# Patient Record
Sex: MALE | URBAN - METROPOLITAN AREA
[De-identification: names, ages, dates, MRNs, and addresses within clinical notes are randomized per-mention and may not be internally consistent; named-entity substitution may affect disease eponyms.]

---

## 2021-12-07 ENCOUNTER — APPOINTMENT (RX ONLY)
Dept: URBAN - METROPOLITAN AREA CLINIC 12 | Facility: CLINIC | Age: 71
Setting detail: DERMATOLOGY
End: 2021-12-07

## 2021-12-07 DIAGNOSIS — L71.1 RHINOPHYMA: ICD-10-CM | Status: WORSENING

## 2021-12-07 PROCEDURE — 99204 OFFICE O/P NEW MOD 45 MIN: CPT

## 2021-12-07 PROCEDURE — ? ORDER TESTS

## 2021-12-07 PROCEDURE — ? OTHER

## 2021-12-07 PROCEDURE — ? COUNSELING

## 2021-12-07 PROCEDURE — ? DIAGNOSIS COMMENT

## 2021-12-07 PROCEDURE — ? TREATMENT REGIMEN

## 2021-12-07 PROCEDURE — ? PRESCRIPTION

## 2021-12-07 RX ORDER — DOXYCYCLINE HYCLATE 100 MG/1
CAPSULE, GELATIN COATED ORAL
Qty: 60 | Refills: 1 | Status: ERX

## 2021-12-07 RX ORDER — TRETIONIN 0.5 MG/G
CREAM TOPICAL
Qty: 45 | Refills: 2 | Status: ERX

## 2021-12-07 NOTE — PROCEDURE: ORDER TESTS
Performing Laboratory: 0
Clinical Notes (To The Lab): for culture and sensitivity
Billing Type: Third-Party Bill
Bill For Surgical Tray: no
Expected Date Of Service: 12/07/2021

## 2021-12-07 NOTE — HPI: EVALUATION OF SKIN LESION(S)
What Type Of Note Output Would You Prefer (Optional)?: Bullet Format
Hpi Title: Evaluation of Skin Lesions
How Severe Are Your Spot(S)?: mild
Have Your Spot(S) Been Treated In The Past?: has not been treated
Additional History: Declines chaperone, here today for evaluation of lesion on nose

## 2021-12-07 NOTE — PROCEDURE: OTHER
Note Text (......Xxx Chief Complaint.): This diagnosis correlates with the
Detail Level: Zone
Other (Free Text): onset x 6 months\\ndenies hx of rosacea; states he cannot get pus out of the lesions but oil comes out of pores.\\ndenies known hx of enlarging or thickening skin on nsoe\\ndenies family hx of similar problems.
Render Risk Assessment In Note?: no

## 2021-12-08 ENCOUNTER — RX ONLY (OUTPATIENT)
Age: 71
Setting detail: RX ONLY
End: 2021-12-08

## 2021-12-08 RX ORDER — TRETIONIN 0.5 MG/G
CREAM TOPICAL
Qty: 45 | Refills: 1 | Status: ACTIVE

## 2021-12-08 RX ORDER — TRETIONIN 0.5 MG/G
CREAM TOPICAL
Qty: 45 | Refills: 2 | Status: ACTIVE

## 2021-12-14 ENCOUNTER — RX ONLY (OUTPATIENT)
Age: 71
Setting detail: RX ONLY
End: 2021-12-14

## 2021-12-14 RX ORDER — SULFAMETHOXAZOLE AND TRIMETHOPRIM 800; 160 MG/1; MG/1
TABLET ORAL
Qty: 28 | Refills: 0 | Status: ERX

## 2021-12-15 ENCOUNTER — RX ONLY (OUTPATIENT)
Age: 71
Setting detail: RX ONLY
End: 2021-12-15

## 2021-12-15 RX ORDER — TRETIONIN 0.5 MG/G
CREAM TOPICAL
Qty: 45 | Refills: 2 | Status: ACTIVE

## 2022-01-04 ENCOUNTER — APPOINTMENT (RX ONLY)
Dept: URBAN - METROPOLITAN AREA CLINIC 12 | Facility: CLINIC | Age: 72
Setting detail: DERMATOLOGY
End: 2022-01-04

## 2022-01-04 DIAGNOSIS — L71.1 RHINOPHYMA: ICD-10-CM

## 2022-01-04 PROCEDURE — ? TREATMENT REGIMEN

## 2022-01-04 PROCEDURE — ? COUNSELING

## 2022-01-04 PROCEDURE — ? MEDICATION COUNSELING

## 2022-01-04 PROCEDURE — 99214 OFFICE O/P EST MOD 30 MIN: CPT

## 2022-01-04 PROCEDURE — ? ORDER TESTS

## 2022-01-04 PROCEDURE — ? OTHER

## 2022-01-04 ASSESSMENT — LOCATION DETAILED DESCRIPTION DERM: LOCATION DETAILED: RIGHT LOWER CUTANEOUS LIP

## 2022-01-04 ASSESSMENT — LOCATION ZONE DERM: LOCATION ZONE: LIP

## 2022-01-04 ASSESSMENT — LOCATION SIMPLE DESCRIPTION DERM: LOCATION SIMPLE: RIGHT LIP

## 2022-01-04 NOTE — PROCEDURE: ORDER TESTS
Performing Laboratory: 0
Bill For Surgical Tray: no
Expected Date Of Service: 01/04/2022
Clinical Notes (To The Lab): STANDING ORDERS: REPEAT LABS MONTHLY\\nFASTING 8-12 HOURS PRIOR TO LABS\\nSTART DATE 1/4/22 \\nEND DATE 1/4/23
Billing Type: Third-Party Bill

## 2022-01-04 NOTE — PROCEDURE: TREATMENT REGIMEN
Plan: consider extractions AND follow up with cosmetic derm [facility].  pt prefers not to go to [place]. Plan: consider extractions AND follow up with cosmetic derm [facility].  pt prefers not to go to [place].

## 2022-01-11 ENCOUNTER — RX ONLY (OUTPATIENT)
Age: 72
Setting detail: RX ONLY
End: 2022-01-11

## 2022-01-11 RX ORDER — ACITRETIN 25 MG/1
CAPSULE ORAL
Qty: 30 | Refills: 2 | Status: ERX

## 2024-02-06 ENCOUNTER — APPOINTMENT (RX ONLY)
Dept: URBAN - METROPOLITAN AREA CLINIC 12 | Facility: CLINIC | Age: 74
Setting detail: DERMATOLOGY
End: 2024-02-06

## 2024-02-06 DIAGNOSIS — L72.0 EPIDERMAL CYST: ICD-10-CM

## 2024-02-06 PROCEDURE — ? COUNSELING

## 2024-02-06 PROCEDURE — 99212 OFFICE O/P EST SF 10 MIN: CPT

## 2024-02-06 PROCEDURE — ? DIAGNOSIS COMMENT

## 2024-02-06 ASSESSMENT — LOCATION ZONE DERM: LOCATION ZONE: SCALP

## 2024-02-06 ASSESSMENT — LOCATION DETAILED DESCRIPTION DERM: LOCATION DETAILED: RIGHT INFERIOR OCCIPITAL SCALP

## 2024-02-06 ASSESSMENT — LOCATION SIMPLE DESCRIPTION DERM: LOCATION SIMPLE: POSTERIOR SCALP

## 2024-02-06 NOTE — HPI: EVALUATION OF SKIN LESION(S)
What Type Of Note Output Would You Prefer (Optional)?: Bullet Format
Hpi Title: Evaluation of Skin Lesions
How Severe Are Your Spot(S)?: moderate
Have Your Spot(S) Been Treated In The Past?: has not been treated
Additional History: Pt accompanied to appointment by wife teddy. \\n\\nDeclines fbe.